# Patient Record
Sex: MALE | Race: WHITE | NOT HISPANIC OR LATINO
[De-identification: names, ages, dates, MRNs, and addresses within clinical notes are randomized per-mention and may not be internally consistent; named-entity substitution may affect disease eponyms.]

---

## 2017-12-20 ENCOUNTER — APPOINTMENT (OUTPATIENT)
Dept: VASCULAR SURGERY | Facility: CLINIC | Age: 47
End: 2017-12-20
Payer: COMMERCIAL

## 2017-12-20 VITALS — HEART RATE: 82 BPM | DIASTOLIC BLOOD PRESSURE: 82 MMHG | OXYGEN SATURATION: 93 % | SYSTOLIC BLOOD PRESSURE: 126 MMHG

## 2017-12-20 PROBLEM — Z00.00 ENCOUNTER FOR PREVENTIVE HEALTH EXAMINATION: Status: ACTIVE | Noted: 2017-12-20

## 2017-12-20 PROCEDURE — 93971 EXTREMITY STUDY: CPT

## 2017-12-20 PROCEDURE — 99244 OFF/OP CNSLTJ NEW/EST MOD 40: CPT | Mod: 25

## 2017-12-21 ENCOUNTER — APPOINTMENT (OUTPATIENT)
Dept: VASCULAR SURGERY | Facility: CLINIC | Age: 47
End: 2017-12-21
Payer: COMMERCIAL

## 2017-12-21 PROCEDURE — 93971 EXTREMITY STUDY: CPT

## 2017-12-21 PROCEDURE — 99244 OFF/OP CNSLTJ NEW/EST MOD 40: CPT | Mod: 25

## 2018-01-03 ENCOUNTER — APPOINTMENT (OUTPATIENT)
Dept: VASCULAR SURGERY | Facility: CLINIC | Age: 48
End: 2018-01-03
Payer: COMMERCIAL

## 2018-01-03 ENCOUNTER — APPOINTMENT (OUTPATIENT)
Dept: OTOLARYNGOLOGY | Facility: CLINIC | Age: 48
End: 2018-01-03
Payer: COMMERCIAL

## 2018-01-03 VITALS
DIASTOLIC BLOOD PRESSURE: 82 MMHG | HEIGHT: 78 IN | WEIGHT: 250 LBS | HEART RATE: 63 BPM | OXYGEN SATURATION: 97 % | BODY MASS INDEX: 28.93 KG/M2 | TEMPERATURE: 97.6 F | SYSTOLIC BLOOD PRESSURE: 122 MMHG

## 2018-01-03 DIAGNOSIS — Z82.49 FAMILY HISTORY OF ISCHEMIC HEART DISEASE AND OTHER DISEASES OF THE CIRCULATORY SYSTEM: ICD-10-CM

## 2018-01-03 DIAGNOSIS — E07.9 DISORDER OF THYROID, UNSPECIFIED: ICD-10-CM

## 2018-01-03 PROCEDURE — 76942 ECHO GUIDE FOR BIOPSY: CPT

## 2018-01-03 PROCEDURE — 99204 OFFICE O/P NEW MOD 45 MIN: CPT | Mod: 25

## 2018-01-03 PROCEDURE — 93971 EXTREMITY STUDY: CPT

## 2018-01-03 PROCEDURE — 10022: CPT

## 2018-01-03 PROCEDURE — 99214 OFFICE O/P EST MOD 30 MIN: CPT | Mod: 25

## 2018-01-03 PROCEDURE — 31575 DIAGNOSTIC LARYNGOSCOPY: CPT

## 2018-01-08 LAB
25(OH)D3 SERPL-MCNC: 20.7 NG/ML
ALBUMIN SERPL ELPH-MCNC: 4.5 G/DL
ALP BLD-CCNC: 103 U/L
ALT SERPL-CCNC: 30 U/L
ANION GAP SERPL CALC-SCNC: 14 MMOL/L
AST SERPL-CCNC: 19 U/L
BILIRUB SERPL-MCNC: 0.9 MG/DL
BUN SERPL-MCNC: 19 MG/DL
CALCIUM SERPL-MCNC: 9.5 MG/DL
CALCIUM SERPL-MCNC: 9.5 MG/DL
CHLORIDE SERPL-SCNC: 103 MMOL/L
CO2 SERPL-SCNC: 27 MMOL/L
CREAT SERPL-MCNC: 1.31 MG/DL
GLUCOSE SERPL-MCNC: 131 MG/DL
PARATHYROID HORMONE INTACT: 19 PG/ML
POTASSIUM SERPL-SCNC: 4.9 MMOL/L
PROT SERPL-MCNC: 7.6 G/DL
SODIUM SERPL-SCNC: 144 MMOL/L
T3FREE SERPL-MCNC: 2.57 PG/ML
T4 FREE SERPL-MCNC: 1 NG/DL
THYROGLOB AB SERPL-ACNC: <20 IU/ML
THYROPEROXIDASE AB SERPL IA-ACNC: 19 IU/ML
TSH SERPL-ACNC: 1.94 UIU/ML

## 2018-01-22 ENCOUNTER — APPOINTMENT (OUTPATIENT)
Dept: OTOLARYNGOLOGY | Facility: CLINIC | Age: 48
End: 2018-01-22
Payer: COMMERCIAL

## 2018-01-22 ENCOUNTER — RESULT REVIEW (OUTPATIENT)
Age: 48
End: 2018-01-22

## 2018-01-22 VITALS
OXYGEN SATURATION: 98 % | DIASTOLIC BLOOD PRESSURE: 85 MMHG | HEART RATE: 67 BPM | SYSTOLIC BLOOD PRESSURE: 118 MMHG | TEMPERATURE: 98.3 F

## 2018-01-22 DIAGNOSIS — R22.1 LOCALIZED SWELLING, MASS AND LUMP, NECK: ICD-10-CM

## 2018-01-22 PROCEDURE — 99215 OFFICE O/P EST HI 40 MIN: CPT | Mod: 25

## 2018-01-23 ENCOUNTER — OUTPATIENT (OUTPATIENT)
Dept: OUTPATIENT SERVICES | Facility: HOSPITAL | Age: 48
LOS: 1 days | End: 2018-01-23
Payer: COMMERCIAL

## 2018-01-23 ENCOUNTER — OUTPATIENT (OUTPATIENT)
Dept: OUTPATIENT SERVICES | Facility: HOSPITAL | Age: 48
LOS: 1 days | End: 2018-01-23

## 2018-01-23 DIAGNOSIS — D44.0 NEOPLASM OF UNCERTAIN BEHAVIOR OF THYROID GLAND: ICD-10-CM

## 2018-01-23 PROCEDURE — 88321 CONSLTJ&REPRT SLD PREP ELSWR: CPT

## 2018-01-25 LAB — NON-GYNECOLOGICAL CYTOLOGY STUDY: SIGNIFICANT CHANGE UP

## 2018-02-21 ENCOUNTER — APPOINTMENT (OUTPATIENT)
Dept: OTOLARYNGOLOGY | Facility: HOSPITAL | Age: 48
End: 2018-02-21

## 2018-02-28 ENCOUNTER — APPOINTMENT (OUTPATIENT)
Dept: VASCULAR SURGERY | Facility: CLINIC | Age: 48
End: 2018-02-28
Payer: COMMERCIAL

## 2018-02-28 PROCEDURE — 93971 EXTREMITY STUDY: CPT

## 2018-02-28 PROCEDURE — 99213 OFFICE O/P EST LOW 20 MIN: CPT | Mod: 25

## 2018-03-06 ENCOUNTER — FORM ENCOUNTER (OUTPATIENT)
Age: 48
End: 2018-03-06

## 2018-03-06 VITALS
WEIGHT: 253.53 LBS | TEMPERATURE: 96 F | HEIGHT: 77 IN | RESPIRATION RATE: 18 BRPM | DIASTOLIC BLOOD PRESSURE: 80 MMHG | SYSTOLIC BLOOD PRESSURE: 122 MMHG | OXYGEN SATURATION: 96 % | HEART RATE: 69 BPM

## 2018-03-07 ENCOUNTER — OUTPATIENT (OUTPATIENT)
Dept: OUTPATIENT SERVICES | Facility: HOSPITAL | Age: 48
LOS: 1 days | Discharge: HOME | End: 2018-03-07
Payer: COMMERCIAL

## 2018-03-07 ENCOUNTER — APPOINTMENT (OUTPATIENT)
Dept: OTOLARYNGOLOGY | Facility: HOSPITAL | Age: 48
End: 2018-03-07

## 2018-03-07 ENCOUNTER — RESULT REVIEW (OUTPATIENT)
Age: 48
End: 2018-03-07

## 2018-03-07 DIAGNOSIS — Z98.890 OTHER SPECIFIED POSTPROCEDURAL STATES: Chronic | ICD-10-CM

## 2018-03-07 PROCEDURE — 60512 AUTOTRANSPLANT PARATHYROID: CPT

## 2018-03-07 PROCEDURE — 95865 NEEDLE EMG LARYNX: CPT | Mod: 26

## 2018-03-07 PROCEDURE — 93970 EXTREMITY STUDY: CPT | Mod: 26

## 2018-03-07 PROCEDURE — 60225 PARTIAL REMOVAL OF THYROID: CPT

## 2018-03-07 RX ORDER — SODIUM CHLORIDE 9 MG/ML
1000 INJECTION, SOLUTION INTRAVENOUS
Qty: 0 | Refills: 0 | Status: DISCONTINUED | OUTPATIENT
Start: 2018-03-07 | End: 2018-03-08

## 2018-03-07 RX ORDER — ONDANSETRON 8 MG/1
4 TABLET, FILM COATED ORAL EVERY 6 HOURS
Qty: 0 | Refills: 0 | Status: DISCONTINUED | OUTPATIENT
Start: 2018-03-07 | End: 2018-03-08

## 2018-03-07 RX ORDER — ACETAMINOPHEN 500 MG
1000 TABLET ORAL ONCE
Qty: 0 | Refills: 0 | Status: COMPLETED | OUTPATIENT
Start: 2018-03-07 | End: 2018-03-07

## 2018-03-07 RX ORDER — ACETAMINOPHEN WITH CODEINE 300MG-30MG
1 TABLET ORAL EVERY 4 HOURS
Qty: 0 | Refills: 0 | Status: DISCONTINUED | OUTPATIENT
Start: 2018-03-07 | End: 2018-03-08

## 2018-03-07 RX ORDER — ACETAMINOPHEN WITH CODEINE 300MG-30MG
2 TABLET ORAL EVERY 4 HOURS
Qty: 0 | Refills: 0 | Status: DISCONTINUED | OUTPATIENT
Start: 2018-03-07 | End: 2018-03-08

## 2018-03-07 RX ADMIN — Medication 400 MILLIGRAM(S): at 22:50

## 2018-03-07 RX ADMIN — Medication 1000 MILLIGRAM(S): at 23:50

## 2018-03-07 RX ADMIN — ONDANSETRON 4 MILLIGRAM(S): 8 TABLET, FILM COATED ORAL at 22:50

## 2018-03-07 NOTE — BRIEF OPERATIVE NOTE - OPERATION/FINDINGS
Preop Dx: Right thyroid nodule  Postop Dx: Same as above  Procedure: Right Thyroid Lobectomy, Isthmusectomy  Findings: Right superior laryngeal nerve and right recurrent laryngeal nerve identified and preserved. Right upper and lower parathyroids identified and preserved.

## 2018-03-07 NOTE — PROGRESS NOTE ADULT - SUBJECTIVE AND OBJECTIVE BOX
POST-OPERATIVE NOTE    Procedure: Right Thyroid Lobectomy, Isthmusectomy    Diagnosis/Indication: Right Thyroid nodule    Surgeon: Dr. Acosta    S: Pt has no complaints. Denies CP, SOB, NUÑEZ, calf tenderness. Pain controlled with medication. Denies nausea, vomiting.    O:  T(C): 36.8 (03-07-18 @ 21:33), Max: 36.8 (03-07-18 @ 21:33)  T(F): 98.3 (03-07-18 @ 21:33), Max: 98.3 (03-07-18 @ 21:33)  HR: 79 (03-07-18 @ 21:33) (79 - 92)  BP: 127/80 (03-07-18 @ 21:33) (119/78 - 127/80)  RR: 17 (03-07-18 @ 21:33) (8 - 17)  SpO2: 96% (03-07-18 @ 21:33) (93% - 97%)  Wt(kg): --    Gen: NAD, resting comfortably in bed  C/V: NSR  Head/Neck: patient has demetrio drain. Bandages around neck are clean, dry and intact.  Pulm: Nonlabored breathing, no respiratory distress  Abd: soft, NT/ND  Extrem: WWP, no calf edema or tenderness, SCDs in place      A/P: 48y Male s/p above procedure  Diet: regular  IVF: LR rate 75  Pain/nausea control  SQH/SCDs/OOBA/IS  Dispo pending pain control, PO tolerance, clinical improvement

## 2018-03-07 NOTE — BRIEF OPERATIVE NOTE - PROCEDURE
<<-----Click on this checkbox to enter Procedure Right thyroid lobectomy  03/07/2018    Active  [id number]

## 2018-03-08 VITALS
DIASTOLIC BLOOD PRESSURE: 73 MMHG | OXYGEN SATURATION: 95 % | HEART RATE: 67 BPM | SYSTOLIC BLOOD PRESSURE: 111 MMHG | TEMPERATURE: 97 F | RESPIRATION RATE: 16 BRPM

## 2018-03-08 PROCEDURE — 60220 PARTIAL REMOVAL OF THYROID: CPT | Mod: RT

## 2018-03-08 PROCEDURE — 88307 TISSUE EXAM BY PATHOLOGIST: CPT

## 2018-03-08 PROCEDURE — 93970 EXTREMITY STUDY: CPT

## 2018-03-08 PROCEDURE — 88305 TISSUE EXAM BY PATHOLOGIST: CPT

## 2018-03-08 PROCEDURE — C1889: CPT

## 2018-03-08 PROCEDURE — 88331 PATH CONSLTJ SURG 1 BLK 1SPC: CPT

## 2018-03-08 NOTE — PROGRESS NOTE ADULT - ASSESSMENT
49 yo male s/p right thyroid lobectomy, isthmusectomy    -Pain/nausea control  -Soft diet, IVF LR @ 75  -No post op CBC  -Venous duplex doppler of b/l LE ( h/o superficial venous thrombosis)  -OOBA/IS/SCDs/  -AM labs  -D/C in AM

## 2018-03-08 NOTE — PROGRESS NOTE ADULT - SUBJECTIVE AND OBJECTIVE BOX
O/N: POC wnl, s/p right thyroid lobectomy, isthmusectomy, 23 hr obs,  lower extremity duplex showed no DVT b/l     POD 1 right thyroid lobectomy, isthmusectomy OVERNIGHT EVENTS:  POC wnl, s/p right thyroid lobectomy, isthmusectomy, 23 hr obs,  lower extremity duplex showed no DVT b/l     POD 1 right thyroid lobectomy, isthmusectomy        SUBJECTIVE: Patient examined bedside denies any complaints  Flatus: [] YES [X] NO             Bowel Movement: [ ] YES [ X] NO  Pain (0-10):            Pain Control Adequate: [X ] YES [ ] NO  Nausea: [ ] YES [ X] NO            Vomiting: [ ] YES [X ] NO  Diarrhea: [ ] YES [X ] NO         Constipation: [ ] YES [X ] NO     Chest Pain: [ ] YES [ X] NO    SOB:  [ ] YES [ X] NO        Vital Signs Last 24 Hrs  T(C): 37 (08 Mar 2018 05:23), Max: 37 (08 Mar 2018 05:23)  T(F): 98.6 (08 Mar 2018 05:23), Max: 98.6 (08 Mar 2018 05:23)  HR: 66 (08 Mar 2018 05:23) (66 - 96)  BP: 121/66 (08 Mar 2018 05:23) (104/69 - 127/80)  BP(mean): 93 (07 Mar 2018 18:45) (88 - 93)  RR: 16 (08 Mar 2018 05:23) (8 - 19)  SpO2: 97% (08 Mar 2018 05:23) (93% - 97%)  I&O's Detail    07 Mar 2018 07:01  -  08 Mar 2018 07:00  --------------------------------------------------------  IN:    lactated ringers.: 635 mL    Other: 1000 mL  Total IN: 1635 mL    OUT:    Bulb: 40 mL    Voided: 550 mL  Total OUT: 590 mL    Total NET: 1045 mL          General: NAD, resting comfortably in bed  C/V: NSR  Pulm: Nonlabored breathing, no respiratory distress  Abd: soft, NT/ND.  Extrem: WWP, no edema, SCDs in place   Neck dressing clean dry and intact . AMPARO drain putting out serosanguinous fluid

## 2018-03-12 ENCOUNTER — APPOINTMENT (OUTPATIENT)
Dept: OTOLARYNGOLOGY | Facility: CLINIC | Age: 48
End: 2018-03-12
Payer: COMMERCIAL

## 2018-03-12 VITALS
HEIGHT: 78 IN | TEMPERATURE: 97.6 F | HEART RATE: 86 BPM | RESPIRATION RATE: 16 BRPM | OXYGEN SATURATION: 98 % | DIASTOLIC BLOOD PRESSURE: 86 MMHG | SYSTOLIC BLOOD PRESSURE: 129 MMHG

## 2018-03-12 DIAGNOSIS — R49.9 UNSPECIFIED VOICE AND RESONANCE DISORDER: ICD-10-CM

## 2018-03-12 PROBLEM — I80.9 PHLEBITIS AND THROMBOPHLEBITIS OF UNSPECIFIED SITE: Chronic | Status: ACTIVE | Noted: 2018-03-06

## 2018-03-12 PROBLEM — E04.1 NONTOXIC SINGLE THYROID NODULE: Chronic | Status: ACTIVE | Noted: 2018-03-06

## 2018-03-12 PROBLEM — J45.909 UNSPECIFIED ASTHMA, UNCOMPLICATED: Chronic | Status: ACTIVE | Noted: 2018-03-06

## 2018-03-12 PROBLEM — F32.9 MAJOR DEPRESSIVE DISORDER, SINGLE EPISODE, UNSPECIFIED: Chronic | Status: ACTIVE | Noted: 2018-03-06

## 2018-03-12 PROCEDURE — 31575 DIAGNOSTIC LARYNGOSCOPY: CPT | Mod: 58

## 2018-03-12 PROCEDURE — 99024 POSTOP FOLLOW-UP VISIT: CPT

## 2018-03-12 RX ORDER — ACETAMINOPHEN AND CODEINE 300; 30 MG/1; MG/1
300-30 TABLET ORAL
Qty: 30 | Refills: 0 | Status: COMPLETED | COMMUNITY
Start: 2018-03-06 | End: 2018-03-12

## 2018-03-12 RX ORDER — AZITHROMYCIN DIHYDRATE 500 MG/1
500 TABLET, FILM COATED ORAL DAILY
Qty: 1 | Refills: 0 | Status: COMPLETED | COMMUNITY
Start: 2018-03-06 | End: 2018-03-12

## 2018-03-16 LAB — SURGICAL PATHOLOGY STUDY: SIGNIFICANT CHANGE UP

## 2018-04-30 ENCOUNTER — APPOINTMENT (OUTPATIENT)
Dept: OTOLARYNGOLOGY | Facility: CLINIC | Age: 48
End: 2018-04-30
Payer: COMMERCIAL

## 2018-04-30 VITALS
DIASTOLIC BLOOD PRESSURE: 93 MMHG | OXYGEN SATURATION: 96 % | TEMPERATURE: 98.2 F | SYSTOLIC BLOOD PRESSURE: 131 MMHG | HEART RATE: 76 BPM

## 2018-04-30 DIAGNOSIS — D49.7 NEOPLASM OF UNSPECIFIED BEHAVIOR OF ENDOCRINE GLANDS AND OTHER PARTS OF NERVOUS SYSTEM: ICD-10-CM

## 2018-04-30 PROCEDURE — 99024 POSTOP FOLLOW-UP VISIT: CPT

## 2018-04-30 RX ORDER — RIVAROXABAN 10 MG/1
10 TABLET, FILM COATED ORAL
Qty: 30 | Refills: 1 | Status: DISCONTINUED | COMMUNITY
Start: 2017-12-21 | End: 2018-04-30

## 2018-05-01 LAB
T3FREE SERPL-MCNC: 2.53 PG/ML
T4 FREE SERPL-MCNC: 0.9 NG/DL
TSH SERPL-ACNC: 10.33 UIU/ML

## 2018-07-13 LAB
T3FREE SERPL-MCNC: 2.24 PG/ML
T4 FREE SERPL-MCNC: 0.9 NG/DL
TSH SERPL-ACNC: 5.18 UIU/ML

## 2018-07-23 ENCOUNTER — APPOINTMENT (OUTPATIENT)
Dept: OTOLARYNGOLOGY | Facility: CLINIC | Age: 48
End: 2018-07-23

## 2018-07-23 DIAGNOSIS — E03.9 HYPOTHYROIDISM, UNSPECIFIED: ICD-10-CM

## 2018-07-23 DIAGNOSIS — E89.0 POSTPROCEDURAL HYPOTHYROIDISM: ICD-10-CM

## 2018-07-23 DIAGNOSIS — D44.0 NEOPLASM OF UNCERTAIN BEHAVIOR OF THYROID GLAND: ICD-10-CM

## 2018-08-13 ENCOUNTER — APPOINTMENT (OUTPATIENT)
Dept: VASCULAR SURGERY | Facility: CLINIC | Age: 48
End: 2018-08-13
Payer: COMMERCIAL

## 2018-08-13 PROCEDURE — 93971 EXTREMITY STUDY: CPT

## 2018-08-13 PROCEDURE — 99212 OFFICE O/P EST SF 10 MIN: CPT

## 2018-08-22 ENCOUNTER — APPOINTMENT (OUTPATIENT)
Dept: VASCULAR SURGERY | Facility: CLINIC | Age: 48
End: 2018-08-22
Payer: COMMERCIAL

## 2018-08-22 VITALS — SYSTOLIC BLOOD PRESSURE: 107 MMHG | DIASTOLIC BLOOD PRESSURE: 70 MMHG | HEART RATE: 66 BPM | OXYGEN SATURATION: 97 %

## 2018-08-22 PROCEDURE — 99214 OFFICE O/P EST MOD 30 MIN: CPT

## 2018-08-22 PROCEDURE — 93971 EXTREMITY STUDY: CPT

## 2018-09-12 ENCOUNTER — APPOINTMENT (OUTPATIENT)
Dept: VASCULAR SURGERY | Facility: CLINIC | Age: 48
End: 2018-09-12
Payer: COMMERCIAL

## 2018-09-12 VITALS — DIASTOLIC BLOOD PRESSURE: 74 MMHG | HEART RATE: 65 BPM | SYSTOLIC BLOOD PRESSURE: 108 MMHG | OXYGEN SATURATION: 98 %

## 2018-09-12 PROCEDURE — 93971 EXTREMITY STUDY: CPT

## 2018-09-12 PROCEDURE — 99213 OFFICE O/P EST LOW 20 MIN: CPT

## 2018-09-18 ENCOUNTER — RX RENEWAL (OUTPATIENT)
Age: 48
End: 2018-09-18

## 2018-10-10 ENCOUNTER — APPOINTMENT (OUTPATIENT)
Dept: VASCULAR SURGERY | Facility: CLINIC | Age: 48
End: 2018-10-10
Payer: COMMERCIAL

## 2018-10-10 VITALS — SYSTOLIC BLOOD PRESSURE: 125 MMHG | OXYGEN SATURATION: 98 % | DIASTOLIC BLOOD PRESSURE: 72 MMHG | HEART RATE: 66 BPM

## 2018-10-10 PROCEDURE — 93971 EXTREMITY STUDY: CPT

## 2018-10-10 PROCEDURE — 99213 OFFICE O/P EST LOW 20 MIN: CPT

## 2018-10-30 LAB
25(OH)D3 SERPL-MCNC: 30.5 NG/ML
BASOPHILS # BLD AUTO: 0.05 K/UL
BASOPHILS NFR BLD AUTO: 0.6 %
EOSINOPHIL # BLD AUTO: 0.16 K/UL
EOSINOPHIL NFR BLD AUTO: 2 %
HCT VFR BLD CALC: 46.9 %
HGB BLD-MCNC: 15.6 G/DL
IMM GRANULOCYTES NFR BLD AUTO: 0.2 %
LYMPHOCYTES # BLD AUTO: 1.66 K/UL
LYMPHOCYTES NFR BLD AUTO: 20.4 %
MAN DIFF?: NORMAL
MCHC RBC-ENTMCNC: 30.9 PG
MCHC RBC-ENTMCNC: 33.3 GM/DL
MCV RBC AUTO: 92.9 FL
MONOCYTES # BLD AUTO: 0.66 K/UL
MONOCYTES NFR BLD AUTO: 8.1 %
NEUTROPHILS # BLD AUTO: 5.57 K/UL
NEUTROPHILS NFR BLD AUTO: 68.7 %
PLATELET # BLD AUTO: 181 K/UL
RBC # BLD: 5.05 M/UL
RBC # FLD: 13.8 %
T3FREE SERPL-MCNC: 2.59 PG/ML
T4 FREE SERPL-MCNC: 0.8 NG/DL
TSH SERPL-ACNC: 6.74 UIU/ML
WBC # FLD AUTO: 8.12 K/UL

## 2018-11-06 ENCOUNTER — RX RENEWAL (OUTPATIENT)
Age: 48
End: 2018-11-06

## 2018-11-14 ENCOUNTER — APPOINTMENT (OUTPATIENT)
Dept: VASCULAR SURGERY | Facility: CLINIC | Age: 48
End: 2018-11-14
Payer: COMMERCIAL

## 2018-11-14 PROCEDURE — 93971 EXTREMITY STUDY: CPT

## 2018-11-14 PROCEDURE — 99213 OFFICE O/P EST LOW 20 MIN: CPT

## 2019-01-16 ENCOUNTER — APPOINTMENT (OUTPATIENT)
Dept: VASCULAR SURGERY | Facility: CLINIC | Age: 49
End: 2019-01-16
Payer: COMMERCIAL

## 2019-01-16 DIAGNOSIS — I82.812 EMBOLISM AND THROMBOSIS OF SUPERFICIAL VEINS OF LEFT LOWER EXTREMITIES: ICD-10-CM

## 2019-01-16 PROCEDURE — 93971 EXTREMITY STUDY: CPT

## 2019-01-16 PROCEDURE — 99214 OFFICE O/P EST MOD 30 MIN: CPT

## 2019-01-16 RX ORDER — RIVAROXABAN 15 MG/1
15 TABLET, FILM COATED ORAL
Qty: 30 | Refills: 0 | Status: DISCONTINUED | COMMUNITY
Start: 2018-08-13 | End: 2019-01-16

## 2019-05-29 RX ORDER — SERTRALINE 25 MG/1
TABLET, FILM COATED ORAL
Refills: 0 | Status: DISCONTINUED | COMMUNITY
End: 2019-05-29

## 2019-05-29 RX ORDER — SERTRALINE HYDROCHLORIDE 50 MG/1
50 TABLET, FILM COATED ORAL
Qty: 60 | Refills: 0 | Status: DISCONTINUED | COMMUNITY
Start: 2017-09-18 | End: 2019-05-29

## 2019-05-29 NOTE — PROCEDURE
[FreeTextEntry1] : LLE US shows no evidence of DVT, there is chronic SVT in distal calf GSV, plantar vein compressive with good flow, DVT resolved.

## 2019-05-29 NOTE — END OF VISIT
[FreeTextEntry3] : Documented by Lazaro Thakkar acting as a scribe for Dr. Dwayne Jarquin on 1/16/2019

## 2019-05-29 NOTE — HISTORY OF PRESENT ILLNESS
[FreeTextEntry1] : 49 y/o M with history of extensive SVT here for follow-up of left leg DVT in the foot prior to planned flight to [place] next week. Patient was found to have left plantar vein DVT five months ago and was started on Xarelto. Today, he is doing well. He denies any pain or recent episodes of swelling. He states that he has stopped Xarelto two months, with no change in his symptoms. \par \par He plans to fly to [place] next week.

## 2019-05-29 NOTE — ASSESSMENT
[FreeTextEntry1] : 49 y/o M with history of extensive SVT and left foot DVT here for follow-up prior to planned flight to [facility]. LLE US today shows no evidence of DVT, there is chronic SVT in distal calf GSV. The plantar vein compressive with good flow, DVT resolved. Will prescribe Eliquis to be taken one hour prior to each flight. Also recommended he perform calf exercises and take two deep breaths every half hour throughout the flight.

## 2019-05-29 NOTE — PHYSICAL EXAM
[Normal Breath Sounds] : Normal breath sounds [Respiratory Effort] : normal respiratory effort [Normal Heart Sounds] : normal heart sounds [2+] : left 2+ [No Rash or Lesion] : No rash or lesion [Alert] : alert [Oriented to Person] : oriented to person [Oriented to Place] : oriented to place [Oriented to Time] : oriented to time [Calm] : calm [Ankle Swelling (On Exam)] : not present [JVD] : no jugular venous distention  [Varicose Veins Of Lower Extremities] : not present [] : not present [de-identified] : well appearing, NAD [de-identified] : NCAT

## 2019-10-23 ENCOUNTER — APPOINTMENT (OUTPATIENT)
Dept: VASCULAR SURGERY | Facility: CLINIC | Age: 49
End: 2019-10-23
Payer: COMMERCIAL

## 2019-10-23 PROCEDURE — 93971 EXTREMITY STUDY: CPT

## 2019-10-23 PROCEDURE — 99214 OFFICE O/P EST MOD 30 MIN: CPT

## 2019-10-23 RX ORDER — RIVAROXABAN 15 MG-20MG
15 & 20 KIT ORAL
Qty: 1 | Refills: 0 | Status: ACTIVE | COMMUNITY
Start: 2019-10-23 | End: 1900-01-01

## 2019-10-23 RX ORDER — APIXABAN 5 MG/1
5 TABLET, FILM COATED ORAL
Qty: 4 | Refills: 2 | Status: DISCONTINUED | COMMUNITY
Start: 2019-01-16 | End: 2019-10-23

## 2019-10-26 NOTE — END OF VISIT
[FreeTextEntry3] : All medical record entries made by the Scribe were at my, Dr. Jarquin's direction and personally dictated by me on 10/23/2019 I have reviewed the chart and agree that the record accurately reflects my personal performance of the history, physical exam, assessment and plan. I have also personally directed, reviewed, and agreed with the chart.\par

## 2019-10-26 NOTE — PHYSICAL EXAM
[Normal Breath Sounds] : Normal breath sounds [Respiratory Effort] : normal respiratory effort [Normal Heart Sounds] : normal heart sounds [2+] : left 2+ [Oriented to Person] : oriented to person [Alert] : alert [No Rash or Lesion] : No rash or lesion [Oriented to Place] : oriented to place [Oriented to Time] : oriented to time [Calm] : calm [JVD] : no jugular venous distention  [Ankle Swelling (On Exam)] : not present [Varicose Veins Of Lower Extremities] : not present [] : not present [de-identified] : well appearing, NAD [de-identified] : NCAT

## 2019-10-26 NOTE — PROCEDURE
[FreeTextEntry1] : RLE acute DVT in PTv mid calf and paired plantar veins. Extensive SVT in the mid calf  and GSV through the cald and multiple varicosities in the calf.

## 2019-10-26 NOTE — ASSESSMENT
[Arterial/Venous Disease] : arterial/venous disease [FreeTextEntry1] : 48 y/o M with history of extensive SVT and left foot DVT, now with Right DVT and mild superficial phlebitis. RLE Venous Duplex completed today shows signs of DVT through PTV to mid calf and down to the plantar veins. SVT was also noted on GSV through to the calf and gross varicose veins. I informed the patient that the DVT is likely provoked from his lunging movement, stressing his Achilles tendon and straining his vein. Will start patient on Xarelto again. He is advised to apply warm soaks to the superficial redness area on his right calf as well as to limit strenuous activities for now. Patient referred to Dr. Lyla Rangel in Hematology. To follow up here in 1 month for reassessment.

## 2019-10-26 NOTE — CONSULT LETTER
[Dear  ___] : Dear  [unfilled], [FreeTextEntry2] : Rich Smith MD\par [address]\[place] [address]\par \par Lyla Rangel MD\par [address]\[place] [address]  [FreeTextEntry1] : I saw Mr. Jin Chaney in my office today. He had an left leg DVT and phlebitis in 2017 as well as plantar vein thrombosis in 2018, both treated with Xarelto. Also, his hypercoagulable workup was negative at the time. Today, he reports concerns for another DVT but on the right leg now. He started feeling pain, redness and discomfort on the leg after very heavy and exertional exercising this weekend. He and his wife (on the phone) would also like referral to a new hematologist. \par \par On exam, no edema, redness or tenderness appreciated on right left. Ultrasound today showed acute DVT in the right posterior tibial vein at the mid calf and paired plantar veins. Extensive SVT in the mid calf  and GSV through the calf.\par \par Mr. Chaney has a new acute DVT on the right leg after heavy exertional exercise.  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  I have started back on Xarelto with loading dosing and per his request given him another hematologic referral with Dr Lyla Rangel. I will see him back in 4 weeks for reassessment. \par \par My complete EMR office note is below for your records.  [FreeTextEntry3] : Sincerely, \par \par Dwayne Jarquin M.D. \par , Surgical Services [facility]\par , Department of Surgery [facility]\par Professor of Surgery, [employer] at [facility]

## 2019-10-26 NOTE — ADDENDUM
[FreeTextEntry1] : Documented by Karishma Womack acting as a scribe for Dr. Dwayne Jarquin on 10/23/2019\par

## 2019-10-26 NOTE — HISTORY OF PRESENT ILLNESS
[FreeTextEntry1] : 48 y/o M with history of extensive SVT and Left DVT, presents here for a recurrent DVT. He reports that 2 days ago on Monday he was working out aggressively in the gym, performing weighted lunges. He states that after he began feeling pain to his right foot and subsequently developed superficial redness to his right calf area. He continues to wear compression stockings. Denies any swelling or rest pain. Patient is also looking for a new hematologist.\par \par

## 2019-12-04 ENCOUNTER — APPOINTMENT (OUTPATIENT)
Dept: VASCULAR SURGERY | Facility: CLINIC | Age: 49
End: 2019-12-04
Payer: COMMERCIAL

## 2019-12-04 DIAGNOSIS — I82.409 ACUTE EMBOLISM AND THROMBOSIS OF UNSPECIFIED DEEP VEINS OF UNSPECIFIED LOWER EXTREMITY: ICD-10-CM

## 2019-12-04 PROCEDURE — 93971 EXTREMITY STUDY: CPT

## 2019-12-04 PROCEDURE — 99214 OFFICE O/P EST MOD 30 MIN: CPT

## 2019-12-13 NOTE — PROCEDURE
[FreeTextEntry1] : RLE venous duplex shows the previous DVT seen in PTV,  vein and paired plantar veins has resolved.  SVT in the GSV unchanged.

## 2019-12-13 NOTE — CONSULT LETTER
[Dear  ___] : Dear  [unfilled], [FreeTextEntry2] : Rich Smith MD\par [address]\[place] [address]\par \par Lyla Rangel MD\par [address]\[place] [address]  [FreeTextEntry1] : I saw Mr. Jin Chaney in my office today for follow-up. He had an left leg DVT and phlebitis in 2017 as well as plantar vein thrombosis in 2018, both treated with Xarelto. Also, his hypercoagulable workup was negative at the time. October 23rd, he presented with a new acute, right leg DVT after exertional exercise, on the posterior tibial vein at the mid calf and paired plantar veins. Also, an extensive SVT in the calf  and GSV through the calf was identified. He was started on Xarelto and referred to Dr Rangel for hematological re-evaluation. \par \par On exam, no edema, redness or tenderness. Ultrasound today showed the previous DVT seen in the right posterior tibial vein,  vein and paired plantar veins have resolved. SVT in the GSV unchanged.\par \par Mr. Chaney had a new acute DVT and SVT on the right leg after heavy exertional exercise. Overall, he has improved per duplex reports however, I have resumed the Xarelto. Also, I emphasized to follow-up with Dr Rangel for a hematological evaluation given his history of recurrent DVTs and to determine duration of anticoagulation therapy.\par \par My complete EMR office note is below for your records [FreeTextEntry3] : Sincerely, \par \par Dwayne Jarquin M.D. \par , Surgical Services [facility]\par , Department of Surgery [facility]\par Professor of Surgery, [employer] at [facility]

## 2019-12-13 NOTE — HISTORY OF PRESENT ILLNESS
[FreeTextEntry1] : 50 y/o M with history of extensive SVT and Left DVT, presents here for follow up on her recent DVT. He reports here feeling well today. He has finished taking the Xarelto with loading dose since last week and is no longer on this. He was previously referred to hematologist,  but did not do so. He continues to wear compression stockings. Denies any pain, redness, swelling or rest pain. \par

## 2019-12-13 NOTE — PHYSICAL EXAM
[Respiratory Effort] : normal respiratory effort [No Rash or Lesion] : No rash or lesion [Alert] : alert [Oriented to Person] : oriented to person [Oriented to Place] : oriented to place [Oriented to Time] : oriented to time [Calm] : calm [JVD] : no jugular venous distention  [Ankle Swelling (On Exam)] : not present [Varicose Veins Of Lower Extremities] : not present [] : not present [de-identified] : well appearing, NAD [de-identified] : NCAT [de-identified] : FROM

## 2019-12-13 NOTE — ASSESSMENT
[Arterial/Venous Disease] : arterial/venous disease [FreeTextEntry1] : 50 y/o M with history of extensive SVT and left foot DVT, now with Right DVT and with recent mild superficial phlebitis. RLE Venous Duplex completed today shows the previous DVTs in the posterior tibial vein,  vein and paired plantar vein have resolved. Patient has yet to see a hematologist as previously recommended. He is instructed to do so to determine need for further anticoagulation therapy. He is advised to continue Xarelto for now. Information to 's office provided to the patient. He is advised to make an appointment. To follow up here PRN.\par

## 2019-12-13 NOTE — ADDENDUM
[FreeTextEntry1] : Documented by Karishma Womack acting as a scribe for Dr. Dwayne Jarquin on 12/04/2019\par

## 2019-12-13 NOTE — END OF VISIT
[FreeTextEntry3] : All medical record entries made by the Scribe were at my, Dr. Jarquin's direction and personally dictated by me on 12/04/2019 I have reviewed the chart and agree that the record accurately reflects my personal performance of the history, physical exam, assessment and plan. I have also personally directed, reviewed, and agreed with the chart.\par

## 2019-12-26 RX ORDER — RIVAROXABAN 20 MG/1
20 TABLET, FILM COATED ORAL
Qty: 30 | Refills: 2 | Status: ACTIVE | COMMUNITY
Start: 2019-12-04 | End: 1900-01-01

## 2020-09-14 ENCOUNTER — APPOINTMENT (OUTPATIENT)
Dept: OTOLARYNGOLOGY | Facility: CLINIC | Age: 50
End: 2020-09-14

## 2021-07-28 ENCOUNTER — APPOINTMENT (OUTPATIENT)
Dept: VASCULAR SURGERY | Facility: CLINIC | Age: 51
End: 2021-07-28
Payer: COMMERCIAL

## 2021-07-28 VITALS
HEIGHT: 77 IN | SYSTOLIC BLOOD PRESSURE: 117 MMHG | BODY MASS INDEX: 26.57 KG/M2 | DIASTOLIC BLOOD PRESSURE: 82 MMHG | WEIGHT: 225 LBS | HEART RATE: 70 BPM

## 2021-07-28 DIAGNOSIS — I80.9 PHLEBITIS AND THROMBOPHLEBITIS OF UNSPECIFIED SITE: ICD-10-CM

## 2021-07-28 DIAGNOSIS — Z78.9 OTHER SPECIFIED HEALTH STATUS: ICD-10-CM

## 2021-07-28 PROCEDURE — 93971 EXTREMITY STUDY: CPT

## 2021-07-28 PROCEDURE — 99214 OFFICE O/P EST MOD 30 MIN: CPT
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
## 2021-08-02 NOTE — ASSESSMENT
[Arterial/Venous Disease] : arterial/venous disease [Medication Management] : medication management [FreeTextEntry1] : 52 y/o M w/ h/o of extensive SVT (2017) and Left foot DVT (2018), RLE DVT (2019) with mild phlebitis and now w/ new RLE DVT. \par \par On exam, RLE small area of induration to the posterior, distal thigh slightly tender to touch, bulging venous varices to the distal thigh and calf. Toes are warm to touch with adequate capillary refill. RLE duplex reveals:  + RLE non occlusive DVT in the proximal popliteal vein. Partial spectral and color doppler signals c/w non occlusive thrombosis. Acute SVT in the varicose tributaries from anterior calf to posterior thigh. Chronic scar tissue noted in the GSV at the distal calf. \par \par Plan: \par - Continue Xarelto. \par - He may continue exercising however, reminded of the importance of avoiding any strenuous activities given AC therapy with high risk of bleeding w/trauma.\par - Recommended he applied heating pad over area of induration 2-3 times a day. \par - Tylenol for pain if needed.\par - F/u here in 4-6 weeks to reassess DVT and SVT with repeat scans.  \par - Will update Dr. David of today's findings and we are interested in what blood work shows, he might benefit long term anticoagulation.

## 2021-08-02 NOTE — ADDENDUM
[FreeTextEntry1] : This note was written by Keyana Frausto on 07/28/2021 acting as scribe for Catrachito Murphy M.D.\par \par  I, Dr. Dwayne Jarquin, personally performed the evaluation and management (E/M) services for this established patient who presents today with (a) new problem(s)/exacerbation of (an) existing condition(s).  That E/M includes conducting the examination, assessing all new/exacerbated conditions, and establishing a new plan of care.  Today, my ACP, Karly Charles NP, was here to observe my evaluation and management services for this new problem/exacerbated condition to be followed going forward.
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
## 2021-08-02 NOTE — PHYSICAL EXAM
[Respiratory Effort] : normal respiratory effort [No Rash or Lesion] : No rash or lesion [Alert] : alert [Oriented to Person] : oriented to person [Oriented to Place] : oriented to place [Oriented to Time] : oriented to time [Calm] : calm [Normal Rate and Rhythm] : normal rate and rhythm [2+] : left 2+ [JVD] : no jugular venous distention  [Ankle Swelling (On Exam)] : not present [Varicose Veins Of Lower Extremities] : not present [] : not present [de-identified] : well appearing, NAD [de-identified] : NCAT [FreeTextEntry1] : RLE small area of induration to the distal posterior thigh slightly tender to touch, bulging venous varices to the distal thigh and calf. Toes are warm to touch with adequate capillary refill.  [de-identified] : FROM

## 2021-08-02 NOTE — PROCEDURE
[FreeTextEntry1] : RLE venous US ordered today to assess thrombus progression reveals: + RLE non occlusive DVT in the proximal popliteal vein. Partial spectral and color doppler signals c/w non occlusive thrombosis. Acute SVT in the varicose tributaries from anterior calf to posterior thigh. Chronic scar tissue noted in the GSV at the distal calf.

## 2021-09-08 ENCOUNTER — APPOINTMENT (OUTPATIENT)
Dept: VASCULAR SURGERY | Facility: CLINIC | Age: 51
End: 2021-09-08
Payer: COMMERCIAL

## 2021-09-08 VITALS
SYSTOLIC BLOOD PRESSURE: 113 MMHG | BODY MASS INDEX: 26.57 KG/M2 | WEIGHT: 225 LBS | HEIGHT: 77 IN | HEART RATE: 57 BPM | DIASTOLIC BLOOD PRESSURE: 68 MMHG

## 2021-09-08 DIAGNOSIS — D68.9 COAGULATION DEFECT, UNSPECIFIED: ICD-10-CM

## 2021-09-08 DIAGNOSIS — I82.409 ACUTE EMBOLISM AND THROMBOSIS OF UNSPECIFIED DEEP VEINS OF UNSPECIFIED LOWER EXTREMITY: ICD-10-CM

## 2021-09-08 PROCEDURE — 99213 OFFICE O/P EST LOW 20 MIN: CPT

## 2021-09-08 PROCEDURE — 93971 EXTREMITY STUDY: CPT
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
## 2021-10-04 NOTE — ADDENDUM
[FreeTextEntry1] : This note was written by Keyana Frausto on 09/09/2021 acting as scribe for Milka Arvizu M.D.\par \par I, Dr. Dwayne Jarquin, personally performed the evaluation and management (E/M) services for this established patient who presents today with (a) new problem(s)/exacerbation of (an) existing condition(s).  That E/M includes conducting the examination, assessing all new/exacerbated conditions, and establishing a new plan of care.  Today, my ACP, Karly Charles NP, was here to observe my evaluation and management services for this new problem/exacerbated condition to be followed going forward.

## 2021-10-04 NOTE — PROCEDURE
[FreeTextEntry1] : RLE venous US ordered today to assess thrombus progression reveals: + RLE mild chronic DVT in pop vein. Mild chronic SVT in varicose veins.

## 2021-10-04 NOTE — PHYSICAL EXAM
[Respiratory Effort] : normal respiratory effort [Normal Rate and Rhythm] : normal rate and rhythm [No Rash or Lesion] : No rash or lesion [Alert] : alert [Oriented to Person] : oriented to person [Oriented to Place] : oriented to place [Oriented to Time] : oriented to time [Calm] : calm [2+] : left 2+ [JVD] : no jugular venous distention  [Carotid Bruits] : no carotid bruits [Ankle Swelling (On Exam)] : not present [Varicose Veins Of Lower Extremities] : not present [] : not present [de-identified] : well appearing, NAD [de-identified] : NCAT [de-identified] : Supple  [FreeTextEntry1] : RUE: two large scabs, one proximal to the cubitus and one over the proximal forearm, dry. No purulence and no sings of infection. RLE with no edema or calf tenderness..  [de-identified] : FROM
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
